# Patient Record
Sex: FEMALE | Race: BLACK OR AFRICAN AMERICAN | Employment: OTHER | URBAN - METROPOLITAN AREA
[De-identification: names, ages, dates, MRNs, and addresses within clinical notes are randomized per-mention and may not be internally consistent; named-entity substitution may affect disease eponyms.]

---

## 2024-01-18 ENCOUNTER — TELEPHONE (OUTPATIENT)
Age: 59
End: 2024-01-18

## 2024-01-18 PROBLEM — M17.11 PRIMARY OSTEOARTHRITIS OF RIGHT KNEE: Status: ACTIVE | Noted: 2024-01-18

## 2024-01-18 PROBLEM — I71.13 RUPTURED ANEURYSM OF DESCENDING THORACIC AORTA (HCC): Status: ACTIVE | Noted: 2024-01-18

## 2024-01-18 PROBLEM — Z98.890 HX OF ARTHROSCOPIC KNEE SURGERY: Status: ACTIVE | Noted: 2024-01-18

## 2024-01-18 PROBLEM — I10 HYPERTENSION: Status: ACTIVE | Noted: 2024-01-18

## 2024-01-18 NOTE — TELEPHONE ENCOUNTER
New Patient called and said that she was called to reschedule the 2/2/24 appt with , due to Dr. Hussein will be in surgery.     Patient said that when she was called to rs the 2/2/24 appt, she was told that they would call her back with a New Appt Date.    Patient said that no one has called her back, and she is only able to go to the [place] location.    Patient is asking to see  as soon as possible at the [facility] [place] for her Right Knee.     Patient tel. [phone].

## 2024-01-18 NOTE — PROGRESS NOTES
Patient: Lizbeth Rizvi                MRN: [id number]       SSN: [id number]  YOB: 1965        AGE: 58 y.o.        SEX: female  BMI: There is no height or weight on file to calculate BMI.    PCP: Balaji Bass MD  01/19/24    Chief Complaint: Knee Pain (Right knee)      1. Primary osteoarthritis of right knee  -     AMB POC XRAY, KNEE; COMPLETE, 4+ VIEW  -     DME Order for (Specify) as OP  -     diclofenac sodium (VOLTAREN) 1 % GEL; Apply 4 g topically 4 times daily, Topical, 4 TIMES DAILY Starting Fri 1/19/2024, Until Thu 4/18/2024, For 90 days, Disp-350 g, R-5, Normal  -     lidocaine (LIDODERM) 5 %; Place 1 patch onto the skin daily 12 hours on, 12 hours off., Disp-30 patch, R-0Normal  2. Hx of right arthroscopic knee surgery  -     AMB POC XRAY, KNEE; COMPLETE, 4+ VIEW  3. 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 Hypertension, unspecified type        HPI:  Lizbeth Rizvi is a 58 y.o. female with chief complaint of   Chief Complaint   Patient presents with    Knee Pain     Right knee     Presents for right knee pain that started after her meniscectomy 15 years ago but has increased in intensity over the last year. She states that her pain is 5/10, and a constant throbbing/ sharp pain. She does report a catching sensation with deep flexion and with extension. The majority of her pain is at her medial joint line. She takes tylenol for her pain currently due to the fact that she cannot take NSAIDs due her history of a ruptured thoracic aortic aneurysm with repair in 2022. She has other significant PMH of HTN and prediabetes. She does take a daily 81mg aspirin.     She is from [place] and establishing care with a new vascular provider in [place]. She has an appointment with them on Feb 15th, 2024.        IMAGING:  Imaging read by myself and interpreted as follows:    January 19, 2024:  4 view x-ray of the right knee including AP, lateral, sunrise, and notch view demonstrates

## 2024-01-19 ENCOUNTER — OFFICE VISIT (OUTPATIENT)
Age: 59
End: 2024-01-19

## 2024-01-19 VITALS — WEIGHT: 253 LBS

## 2024-01-19 DIAGNOSIS — I10 HYPERTENSION, UNSPECIFIED TYPE: ICD-10-CM

## 2024-01-19 DIAGNOSIS — Z98.890 HX OF ARTHROSCOPIC KNEE SURGERY: ICD-10-CM

## 2024-01-19 DIAGNOSIS — M17.11 PRIMARY OSTEOARTHRITIS OF RIGHT KNEE: Primary | ICD-10-CM

## 2024-01-19 DIAGNOSIS — I71.13 RUPTURED ANEURYSM OF DESCENDING THORACIC AORTA (HCC): ICD-10-CM

## 2024-01-19 RX ORDER — CITALOPRAM HYDROBROMIDE 10 MG/1
10 TABLET ORAL
COMMUNITY

## 2024-01-19 RX ORDER — FUROSEMIDE 20 MG/1
20 TABLET ORAL
COMMUNITY

## 2024-01-19 RX ORDER — AMLODIPINE BESYLATE 10 MG/1
10 TABLET ORAL
COMMUNITY

## 2024-01-19 RX ORDER — ACETAMINOPHEN 325 MG/1
650 TABLET ORAL EVERY 4 HOURS PRN
COMMUNITY
Start: 2022-07-08

## 2024-01-19 RX ORDER — CLONIDINE HYDROCHLORIDE 0.2 MG/1
0.2 TABLET ORAL 2 TIMES DAILY
COMMUNITY
Start: 2024-01-02

## 2024-01-19 RX ORDER — LOSARTAN POTASSIUM 100 MG/1
100 TABLET ORAL DAILY
COMMUNITY

## 2024-01-19 RX ORDER — ATORVASTATIN CALCIUM 20 MG/1
20 TABLET, FILM COATED ORAL DAILY
COMMUNITY

## 2024-01-19 RX ORDER — LIDOCAINE 50 MG/G
1 PATCH TOPICAL DAILY
Qty: 30 PATCH | Refills: 0 | Status: SHIPPED | OUTPATIENT
Start: 2024-01-19 | End: 2024-02-18

## 2024-01-19 RX ORDER — ASPIRIN 81 MG/1
81 TABLET, COATED ORAL
COMMUNITY
Start: 2024-01-02 | End: 2024-04-01

## 2024-02-15 ENCOUNTER — OFFICE VISIT (OUTPATIENT)
Age: 59
End: 2024-02-15
Payer: COMMERCIAL

## 2024-02-15 VITALS
SYSTOLIC BLOOD PRESSURE: 118 MMHG | OXYGEN SATURATION: 96 % | WEIGHT: 255 LBS | DIASTOLIC BLOOD PRESSURE: 77 MMHG | HEART RATE: 62 BPM

## 2024-02-15 DIAGNOSIS — R06.09 DOE (DYSPNEA ON EXERTION): ICD-10-CM

## 2024-02-15 DIAGNOSIS — I10 HYPERTENSION, UNSPECIFIED TYPE: Primary | ICD-10-CM

## 2024-02-15 PROCEDURE — 99214 OFFICE O/P EST MOD 30 MIN: CPT | Performed by: INTERNAL MEDICINE

## 2024-02-15 PROCEDURE — 3074F SYST BP LT 130 MM HG: CPT | Performed by: INTERNAL MEDICINE

## 2024-02-15 PROCEDURE — 3078F DIAST BP <80 MM HG: CPT | Performed by: INTERNAL MEDICINE

## 2024-02-15 PROCEDURE — 93000 ELECTROCARDIOGRAM COMPLETE: CPT | Performed by: INTERNAL MEDICINE

## 2024-02-15 NOTE — PROGRESS NOTES
[facility]    Lizbeth Rizvi is 58 y.o. female     Patient is here today for cardiac evaluation  Denies prior history of MI or CHF  Patient has history of aortic rupture of the descending thoracic aorta at the level of the ligamentum   arteriosum with pseudoaneurysm formation associated with large volume pneumomediastinum and periaortic hematoma documented by CAT scan in 06/2022.  Patient underwent urgent TEVAR in 06/2022 at [facility]    Patient is here today to establish care.  Her main concerns this procedure is fatigue and tiredness.  She gets significant dyspnea with minimal exertion.  She also has sometimes sharp chest pain lasting for few seconds.  She also has been experiencing some exertional left shoulder heaviness since that procedure.  She sometimes feels that her left arm is heavy.  On and off lower extremity swelling.  No PND.  No palpitation, presyncope or syncope  Denies any nausea, vomiting, abdominal pain, fever, chills, sputum production. No hematuria or other bleeding complaints    Past Medical History:   Diagnosis Date    Anxiety     Aortic aneurysm (HCC) 06/2023    Descending aortic aneurysm dissection and rupture S/P TEVAR 06/2022    Depression     HLD (hyperlipidemia)     HTN (hypertension)        Review of Systems:  Cardiac symptoms as noted above in HPI. All others negative.  Denies fatigue, malaise, skin rash, joint pain, blurring vision, photophobia, neck pain, hemoptysis, chronic cough, nausea, vomiting, hematuria, burning micturition, BRBPR, chronic headaches.    Current Outpatient Medications   Medication Sig    amLODIPine (NORVASC) 10 MG tablet Take 1 tablet by mouth    ASPIRIN LOW DOSE 81 MG EC tablet Take 1 tablet by mouth    atorvastatin (LIPITOR) 20 MG tablet Take 1 tablet by mouth daily    cloNIDine (CATAPRES) 0.2 MG tablet Take 1 tablet by mouth 2 times daily    citalopram (CELEXA) 10 MG 
Identified pt with two pt identifiers(name and ). Reviewed record in preparation for visit and have obtained necessary documentation.    Lizbeth Rizvi presents today for   Chief Complaint   Patient presents with    New Patient     htn       Pt c/o SOB, CHEST PAIN/ PRESSURE, FATIGUE/WEAKNESS,  SWELLING.             Lizbeth Rizvi preferred language for health care discussion is english/other.    Personal Protective Equipment:   Personal Protective Equipment was used including: mask-surgical and hands-gloves. Patient was placed on no precaution(s). Patient was not masked.    Precautions:   Patient currently on None  Patient currently roomed with door closed.    Is someone accompanying this pt? no    Is the patient using any DME equipment during OV? Knee brace    Depression Screening:       No data to display                 Learning Assessment:  No question data found.    Abuse Screenin/15/2024     8:00 AM   AMB Abuse Screening   Do you ever feel afraid of your partner? N   Are you in a relationship with someone who physically or mentally threatens you? N   Is it safe for you to go home? Y          Fall Risk      2/15/2024     8:43 AM   Fall Risk   2 or more falls in past year? yes   Fall with injury in past year? no         Pt currently taking Anticoagulant therapy? no  Pt currently taking Antiplatelet therapy? Aspirin 81    Coordination of Care:  1. Have you been to the ER, urgent care clinic since your last visit? Hospitalized since your last visit? no    2. Have you seen or consulted any other health care providers outside of the [facility] since your last visit? Include any pap smears or colon screening. no      Please see Red banners under Allergies and Med Rec to remove outside inquires. All correct information has been verified with patient and added to chart.     Medication's patient's would liked removed has been marked not taking to be removed per Verbal order and read back per 
None known

## 2024-02-15 NOTE — PATIENT INSTRUCTIONS
Testing   Echo/ Nuclear Stress  Nuclear Stress Instructions-Nothing to eat or drink past midnight and no medicaitons the morning of cardiac testing.  **call office 3-5 days after testing is completed for results** Please ensure testing is completed prior to scheduled follow up appointment. If it is not completed your appointment may be rescheduled**    Referral  Referred to Dr Garcia- they will contact you for appt date and time

## 2024-03-01 ENCOUNTER — OFFICE VISIT (OUTPATIENT)
Age: 59
End: 2024-03-01

## 2024-03-01 VITALS — WEIGHT: 255 LBS | HEIGHT: 70 IN | BODY MASS INDEX: 36.51 KG/M2

## 2024-03-01 DIAGNOSIS — Z98.890 HX OF ARTHROSCOPIC KNEE SURGERY: ICD-10-CM

## 2024-03-01 DIAGNOSIS — M17.11 PRIMARY OSTEOARTHRITIS OF RIGHT KNEE: Primary | ICD-10-CM

## 2024-03-01 NOTE — PROGRESS NOTES
Patient: Lizbeth Rizvi                MRN: [id number]       SSN: [id number]  YOB: 1965        AGE: 58 y.o.        SEX: female  BMI: Body mass index is 36.59 kg/m².    PCP: Balaji Bass MD  03/01/24    Chief Complaint: Knee Pain (Right knee)      1. Primary osteoarthritis of right knee  2. Hx of right arthroscopic knee surgery          HPI:  Lizbeth Rizvi is a 58 y.o. female with chief complaint of   Chief Complaint   Patient presents with    Knee Pain     Right knee     Presents for right knee pain that started after her meniscectomy 15 years ago but has increased in intensity over the last year. She states that her pain is 5/10, and a constant throbbing/ sharp pain. She does report a catching sensation with deep flexion and with extension. The majority of her pain is at her medial joint line. She takes tylenol for her pain currently due to the fact that she cannot take NSAIDs due her history of a ruptured thoracic aortic aneurysm with repair in 2022. She has other significant PMH of HTN and prediabetes. She does take a daily 81mg aspirin.     She is from [place] and establishing care with a new vascular provider in [place]. She has an appointment with them on Feb 15th, 2024.        IMAGING:  Imaging read by myself and interpreted as follows:    January 19, 2024:  4 view x-ray of the right knee including AP, lateral, sunrise, and notch view demonstrates tricompartmental osteoarthritis with bone on bone articulation of the medial compartment with genu valgum deformity bilaterally. There is evidence of subchondral sclerosis, subchondral cyst formation, and osteophytosis in all compartments with squaring of the condyles. There is a large osteophyte in the patellofemoral joint seen on lateral and sunrise view and osteophytes at the posterior aspect of the knee.       PHYSICAL EXAMINATION:  Ht 1.778 m (5' 10\")   Wt 115.7 kg (255 lb)   BMI 36.59 kg/m²   Body mass index is 36.59 kg/m².  Wt Readings

## 2024-03-21 ENCOUNTER — TELEPHONE (OUTPATIENT)
Age: 59
End: 2024-03-21

## 2024-03-21 DIAGNOSIS — I10 HYPERTENSION, UNSPECIFIED TYPE: ICD-10-CM

## 2024-03-21 DIAGNOSIS — R06.09 DOE (DYSPNEA ON EXERTION): ICD-10-CM

## 2024-03-21 DIAGNOSIS — I71.13 RUPTURED ANEURYSM OF DESCENDING THORACIC AORTA (HCC): Primary | ICD-10-CM

## 2024-03-21 NOTE — TELEPHONE ENCOUNTER
Contacted pt at  number. Two patient Identifiers confirmed. Informed pt I refaxed order back to  Dr Garcia office for review . Pt verbalized understanding.

## 2024-03-21 NOTE — TELEPHONE ENCOUNTER
Patient came into the office and was wondering about a referral for Dr. Garcia. She was wondering if the referral has been made because she has not received a call yet about the referral.

## 2024-05-23 ENCOUNTER — OFFICE VISIT (OUTPATIENT)
Age: 59
End: 2024-05-23
Payer: COMMERCIAL

## 2024-05-23 VITALS
HEART RATE: 60 BPM | BODY MASS INDEX: 36.36 KG/M2 | DIASTOLIC BLOOD PRESSURE: 88 MMHG | WEIGHT: 254 LBS | SYSTOLIC BLOOD PRESSURE: 142 MMHG | HEIGHT: 70 IN | OXYGEN SATURATION: 96 %

## 2024-05-23 DIAGNOSIS — M17.11 PRIMARY OSTEOARTHRITIS OF RIGHT KNEE: Primary | ICD-10-CM

## 2024-05-23 DIAGNOSIS — I71.13 RUPTURED ANEURYSM OF DESCENDING THORACIC AORTA (HCC): Primary | ICD-10-CM

## 2024-05-23 PROCEDURE — 99213 OFFICE O/P EST LOW 20 MIN: CPT | Performed by: INTERNAL MEDICINE

## 2024-05-23 PROCEDURE — 3079F DIAST BP 80-89 MM HG: CPT | Performed by: INTERNAL MEDICINE

## 2024-05-23 PROCEDURE — 3077F SYST BP >= 140 MM HG: CPT | Performed by: INTERNAL MEDICINE

## 2024-05-23 RX ORDER — LABETALOL 300 MG/1
300 TABLET, FILM COATED ORAL 2 TIMES DAILY
COMMUNITY

## 2024-05-23 RX ORDER — MONTELUKAST SODIUM 10 MG/1
10 TABLET ORAL
COMMUNITY

## 2024-05-23 RX ORDER — LABETALOL 300 MG/1
300 TABLET, FILM COATED ORAL 2 TIMES DAILY
COMMUNITY
Start: 2023-01-25

## 2024-05-23 RX ORDER — LIDOCAINE 50 MG/G
1 PATCH TOPICAL DAILY
Qty: 30 PATCH | Refills: 0 | Status: SHIPPED | OUTPATIENT
Start: 2024-05-23 | End: 2024-06-22

## 2024-05-23 ASSESSMENT — PATIENT HEALTH QUESTIONNAIRE - PHQ9
2. FEELING DOWN, DEPRESSED OR HOPELESS: NOT AT ALL
SUM OF ALL RESPONSES TO PHQ QUESTIONS 1-9: 0
SUM OF ALL RESPONSES TO PHQ QUESTIONS 1-9: 0
1. LITTLE INTEREST OR PLEASURE IN DOING THINGS: NOT AT ALL
SUM OF ALL RESPONSES TO PHQ QUESTIONS 1-9: 0
SUM OF ALL RESPONSES TO PHQ QUESTIONS 1-9: 0
SUM OF ALL RESPONSES TO PHQ9 QUESTIONS 1 & 2: 0

## 2024-05-23 NOTE — PROGRESS NOTES
Identified pt with two pt identifiers(name and ). Reviewed record in preparation for visit and have obtained necessary documentation.    Lizbeth Rizvi presents today for   Chief Complaint   Patient presents with    Follow-up       Pt c/o DIZZINESS, SOB, , FATIGUE/WEAKNESS,  SWELLING.             Lizbeth Rizvi preferred language for health care discussion is english/other.    Personal Protective Equipment:   Personal Protective Equipment was used including: mask-surgical and hands-gloves. Patient was placed on no precaution(s). Patient was masked.    Precautions:   Patient currently on None  Patient currently roomed with door closed.    Is someone accompanying this pt? no    Is the patient using any DME equipment during OV? no    Depression Screenin/23/2024     2:03 PM   PHQ-9 Questionaire   Little interest or pleasure in doing things 0   Feeling down, depressed, or hopeless 0   PHQ-9 Total Score 0        Learning Assessment:  No question data found.    Abuse Screenin/23/2024     2:00 PM 2/15/2024     8:00 AM   AMB Abuse Screening   Do you ever feel afraid of your partner? N N   Are you in a relationship with someone who physically or mentally threatens you? N N   Is it safe for you to go home? Y Y          Fall Risk      2024     2:04 PM 2/15/2024     8:43 AM   Fall Risk   2 or more falls in past year? no yes   Fall with injury in past year? no no         Pt currently taking Anticoagulant /Antiplatelet therapy? aspirin    Coordination of Care:  1. Have you been to the ER, urgent care clinic since your last visit? Hospitalized since your last visit? no    2. Have you seen or consulted any other health care providers outside of the [facility] since your last visit? Include any pap smears or colon screening. no      Please see Red banners under Allergies and Med Rec to remove outside inquires. All correct information has been verified with patient and added to chart. 
06/2022 at [facility]  Will refer patient to vascular surgery for management    Currently patient does not have any symptoms that is concerning for angina or heart failure    This plan was discussed with patient who is in agreement.    Thank you for allowing me to participate in patient care. Please feel free to call me if you have any question or concern.     Kalia Rivera MD  Please note: This document has been produced using voice recognition software. Unrecognized errors in transcription may be present.

## 2024-08-30 ENCOUNTER — OFFICE VISIT (OUTPATIENT)
Age: 59
End: 2024-08-30

## 2024-08-30 VITALS — BODY MASS INDEX: 35.79 KG/M2 | WEIGHT: 250 LBS | HEIGHT: 70 IN

## 2024-08-30 DIAGNOSIS — M47.817 LUMBAR AND SACRAL OSTEOARTHRITIS: Primary | ICD-10-CM

## 2024-08-30 DIAGNOSIS — M17.11 PRIMARY OSTEOARTHRITIS OF RIGHT KNEE: ICD-10-CM

## 2024-08-30 NOTE — PROGRESS NOTES
up. This was put in today. She states that her knee is still doing great with the offloader brace. She can follow up with us as needed.       March 1, 2024:  Severe osteoarthritis of right knee with genu varum angulation. Pt here for her 4 weeks follow up after getting her medial offloading brace. She is in 0/10 pain today and says that the brace is a \"miracle worker\". She reports being able to start walking regularly again. She had no other questions or concerns today. I will reorder her lidocaine patches and she can follow up with us as needed.     January 19, 2024:  Severe osteoarthritis of the right knee with genu varum deformity. On exam pt had medial joint line tenderness with a catching sensation at full extension and flexion and positive patellar crepitus. Her ROM is 0-120. She had minimal ttp at the lateral jointline, patella, and patellar and quad tendons. X-rays demonstrated a genu varum tricompartmental osteoarthritic knee with bone on bone articulation in the medial compartment. I offered an injection to the right knee today but pt said she would like to wait and instead would like to try a medial offloading brace, voltaren gel, and lidocaine patches. Order for these were sent today. Once she is able to get her offloading brace, she will schedule a 4 week follow up appointment after that.     A total knee replacement was discussed today and pt understands that she will need to get clearance from vascular before we can proceed with any type of replacement surgery.         3/1/2024     1:34 PM   AMB PAIN ASSESSMENT   Location of Pain Knee   Location Modifiers Right   Severity of Pain 0     Tobacco Use: Low Risk  (5/23/2024)    Patient History     Smoking Tobacco Use: Never     Smokeless Tobacco Use: Never     Passive Exposure: Never         Past Medical History:   Diagnosis Date    Anxiety     Aortic aneurysm (HCC) 06/2023    Descending aortic aneurysm dissection and rupture S/P TEVAR 06/2022     Depression     HLD (hyperlipidemia)     HTN (hypertension)        No family history on file.    Current Outpatient Medications   Medication Sig Dispense Refill    labetalol (NORMODYNE) 300 MG tablet Take 1 tablet by mouth 2 times daily      labetalol (NORMODYNE) 300 MG tablet Take 1 tablet by mouth 2 times daily      montelukast (SINGULAIR) 10 MG tablet Take 1 tablet by mouth      acetaminophen (TYLENOL) 325 MG tablet Take 2 tablets by mouth every 4 hours as needed      amLODIPine (NORVASC) 10 MG tablet Take 1 tablet by mouth      atorvastatin (LIPITOR) 20 MG tablet Take 1 tablet by mouth daily      cloNIDine (CATAPRES) 0.2 MG tablet Take 1 tablet by mouth 2 times daily      citalopram (CELEXA) 10 MG tablet Take 1 tablet by mouth      furosemide (LASIX) 20 MG tablet Take 1 tablet by mouth      losartan (COZAAR) 100 MG tablet Take 1 tablet by mouth daily      ASPIRIN LOW DOSE 81 MG EC tablet Take 1 tablet by mouth      diclofenac sodium (VOLTAREN) 1 % GEL Apply 4 g topically 4 times daily 350 g 5     No current facility-administered medications for this visit.        No Known Allergies    No past surgical history on file.    Social History     Socioeconomic History    Marital status: Unknown     Spouse name: Not on file    Number of children: Not on file    Years of education: Not on file    Highest education level: Not on file   Occupational History    Not on file   Tobacco Use    Smoking status: Never     Passive exposure: Never    Smokeless tobacco: Never   Substance and Sexual Activity    Alcohol use: Never    Drug use: Never    Sexual activity: Not on file   Other Topics Concern    Not on file   Social History Narrative    Not on file     Social Determinants of Health     Financial Resource Strain: Not on file   Food Insecurity: Not on file   Transportation Needs: Not on file   Physical Activity: Not on file   Stress: Not on file   Social Connections: Not on file   Intimate Partner Violence: Not on file

## 2024-09-23 SDOH — HEALTH STABILITY: PHYSICAL HEALTH: ON AVERAGE, HOW MANY DAYS PER WEEK DO YOU ENGAGE IN MODERATE TO STRENUOUS EXERCISE (LIKE A BRISK WALK)?: 2 DAYS

## 2024-09-25 ENCOUNTER — OFFICE VISIT (OUTPATIENT)
Age: 59
End: 2024-09-25
Payer: COMMERCIAL

## 2024-09-25 VITALS
BODY MASS INDEX: 35.44 KG/M2 | OXYGEN SATURATION: 98 % | DIASTOLIC BLOOD PRESSURE: 90 MMHG | SYSTOLIC BLOOD PRESSURE: 132 MMHG | WEIGHT: 247 LBS | HEART RATE: 58 BPM

## 2024-09-25 DIAGNOSIS — I73.9 PAD (PERIPHERAL ARTERY DISEASE) (HCC): Primary | ICD-10-CM

## 2024-09-25 DIAGNOSIS — E78.5 DYSLIPIDEMIA: ICD-10-CM

## 2024-09-25 DIAGNOSIS — I71.13 RUPTURED ANEURYSM OF DESCENDING THORACIC AORTA (HCC): ICD-10-CM

## 2024-09-25 DIAGNOSIS — I10 PRIMARY HYPERTENSION: ICD-10-CM

## 2024-09-25 PROCEDURE — 3075F SYST BP GE 130 - 139MM HG: CPT | Performed by: INTERNAL MEDICINE

## 2024-09-25 PROCEDURE — 3080F DIAST BP >= 90 MM HG: CPT | Performed by: INTERNAL MEDICINE

## 2024-09-25 PROCEDURE — 99214 OFFICE O/P EST MOD 30 MIN: CPT | Performed by: INTERNAL MEDICINE

## 2024-09-26 ENCOUNTER — OFFICE VISIT (OUTPATIENT)
Age: 59
End: 2024-09-26

## 2024-09-26 VITALS — WEIGHT: 246 LBS | BODY MASS INDEX: 35.22 KG/M2 | HEIGHT: 70 IN

## 2024-09-26 DIAGNOSIS — M16.11 ARTHRITIS OF RIGHT HIP: ICD-10-CM

## 2024-09-26 DIAGNOSIS — M25.551 RIGHT HIP PAIN: Primary | ICD-10-CM

## 2024-09-26 DIAGNOSIS — M76.31 IT BAND SYNDROME, RIGHT: ICD-10-CM

## 2024-09-26 DIAGNOSIS — M25.651 DECREASED RANGE OF RIGHT HIP MOVEMENT: ICD-10-CM

## 2024-09-26 DIAGNOSIS — E66.9 OBESITY (BMI 35.0-39.9 WITHOUT COMORBIDITY): ICD-10-CM

## 2024-09-26 RX ORDER — TRIAMCINOLONE ACETONIDE 40 MG/ML
80 INJECTION, SUSPENSION INTRA-ARTICULAR; INTRAMUSCULAR ONCE
Status: COMPLETED | OUTPATIENT
Start: 2024-09-26 | End: 2024-09-26

## 2024-09-26 RX ORDER — BUPIVACAINE HYDROCHLORIDE 5 MG/ML
7 INJECTION, SOLUTION PERINEURAL ONCE
Status: COMPLETED | OUTPATIENT
Start: 2024-09-26 | End: 2024-09-26

## 2024-09-26 RX ADMIN — BUPIVACAINE HYDROCHLORIDE 35 MG: 5 INJECTION, SOLUTION PERINEURAL at 11:28

## 2024-09-26 RX ADMIN — TRIAMCINOLONE ACETONIDE 80 MG: 40 INJECTION, SUSPENSION INTRA-ARTICULAR; INTRAMUSCULAR at 11:29

## 2024-09-30 DIAGNOSIS — M17.11 PRIMARY OSTEOARTHRITIS OF RIGHT KNEE: ICD-10-CM

## 2024-10-08 ENCOUNTER — HOSPITAL ENCOUNTER (OUTPATIENT)
Facility: HOSPITAL | Age: 59
Setting detail: RECURRING SERIES
Discharge: HOME | End: 2024-10-11
Payer: COMMERCIAL

## 2024-10-08 PROCEDURE — 97161 PT EVAL LOW COMPLEX 20 MIN: CPT

## 2024-10-08 NOTE — PROGRESS NOTES
[facility]  [address] [place] [address] Phone: [phone] Fax   Plan of Care / Statement of Necessity for Physical Therapy Services     Patient Name: Lizbeth Rizvi : 1965   Medical   Diagnosis: Pain in right hip [M25.551] Treatment Diagnosis: M25.551  RIGHT HIP PAIN      Onset Date: 24 (onset) Payor Payor: [employer] [facility] [place] / Plan: [employer] BETTER [facility] [place] / Product Type: *No Product type* /    Referral Source: Reji Rivera PA-C Start of Care (SOC): 10/8/2024   Prior Hospitalization: See medical history Provider #: [id number]   Prior Level of Function: Functionally independent   Comorbidities: hx of thoracic aortic aneurysm, HTN, HLD, hx of intermittent sciatica  Social determinants of health: Physical activity     Assessment / key information:    Pt is a pleasant 58 y.o. female who presents with c/o right hip pain. The patient reports an acute onset of right lower back pain and right lateral hip pain in 2024 without known mechanism; this pain occasionally radiated down into right lateral thigh to knee before being mostly relieved following recent steroid injection to right trochanteric bursa. Signs/symptoms at eval consistent with right greater trochanteric pain syndrome with presence of underlying hip OA.  Functional deficits include: impaired hip AROM, impaired hip strength, impaired SLS balance, antalgic gait pattern, right knee varus contributing to altered kinematic chain.  Rehab potential is good due to desire to attain PLOF. Pt would benefit from skilled PT to address above deficits to improve Pt's function and ability to return to PLOF with decreased pain and improved functional mobility.      Evaluation Complexity:  History:  HIGH Complexity :3+ comorbidities / personal factors will impact the outcome/ POC ; Examination:  LOW Complexity : 1-2 Standardized tests and measures addressing body 
balance, and proprioception in order to improve patient's ability to progress to PLOF and address remaining functional goals.  (see flow sheet as applicable)     Details if applicable:  Patient education on therapy assessment, prognosis, expectations for therapy sessions, patient goals, right trochanteric pain syndrome, and HEP.   Total  15  Reminder: MC/BC bill using total billable min of TIMED therapeutic procedures (example: do not include dry needle or estim unattended, both untimed codes, in totals to left)     [x]  Patient Education billed concurrently with other procedures     Other Objective/Functional Measures:     Physical Therapy Evaluation - LUMBAR    Observation:   Increased right genu varus  Palpation: TTP at right greater trochanter, right gluteal musculature    Strength:   Right (/5) Left (/5)   Hip     Flexion 4+ 5             Abduction 4- 4+             Adduction               Extension 3- 3-             ER 4- 5             IR 5 5   Knee   Extension 5 5              Flexion 5 5   Ankle   Dorsiflexion 5 5       Gait: antalgic gait pattern right    Functional Squat: decreased depth, limited by knee pain    Stair Negotiation: step to ascent/descent right LE non weight bearing    Balance: SLS 1\", 3\"    Special Tests:      Right Left   Slump       SLR - -   -   Right Left   Hamstring 90/90   + +   Ely + +   Brian     Fernanda       -   Right Left   TAE +   -   FADDIR + -   Hip Scour + -   -    ASSESSMENT/Changes in Function: see POC    Patient will continue to benefit from skilled PT services to modify and progress therapeutic interventions, analyze and address functional mobility deficits, analyze and address ROM deficits, analyze and address strength deficits, analyze and address soft tissue restrictions, analyze and cue for proper movement patterns, analyze and modify for postural abnormalities, analyze and address imbalance/dizziness, and instruct in home and community integration to address

## 2024-10-30 ENCOUNTER — HOSPITAL ENCOUNTER (OUTPATIENT)
Facility: HOSPITAL | Age: 59
Setting detail: RECURRING SERIES
Discharge: HOME | End: 2024-11-02
Payer: COMMERCIAL

## 2024-10-30 PROCEDURE — 97116 GAIT TRAINING THERAPY: CPT

## 2024-10-30 PROCEDURE — 97110 THERAPEUTIC EXERCISES: CPT

## 2024-10-30 PROCEDURE — 97530 THERAPEUTIC ACTIVITIES: CPT

## 2024-10-30 PROCEDURE — 97112 NEUROMUSCULAR REEDUCATION: CPT

## 2024-10-30 NOTE — PROGRESS NOTES
strength/mobility  Status at last note/certification: Established and reviewed with Pt  Current: goal progressing; patient notes no difficulty with compliance to HEP (10/30/24)  Long Term Goals: To be accomplished in 10 treatments  - Goal: Pt to demonstrate at least 4/5 B hip extension MMT to improve hip stability during stance phase of gait.   Status at last note/certification: 3-/5  - Goal: Pt to demonstrate 5/5 right hip abduction MMT to improve hip stability during stance phase of gait.   Status at last note/certification: 4-/5  - Goal: Pt will demo B SLS for at least 10 seconds without LOB to improve safety with obstacle negotiation and curb ascent/descent.  Status at last note/certification: SLS right 1 second, left 3 seconds  Current: goal progressing; initiated half-standing to progress to goal without increased knee pain (10/30/24)  - Goal: Patient will improve LEFS score to at least 56 pts to demonstrate increased functional mobility.  Status at last note/certification: LEFS 47 pts       Next PN/RC due 11/8/24  Authorization due (visit number/date) 16 visits / 12-6-24    PLAN  - Continue Plan of Care  - Upgrade activities as tolerated      If an interpreting service was utilized for treatment of this patient, the contents of this document represent the material reviewed with the patient via the .    Mannie Dowd PTA    10/30/2024    10:47 AM    Future Appointments   Date Time Provider Department Center   11/1/2024 10:20 AM Angelic Benites PT [facility]   11/5/2024 11:00 AM [facility] PT [facility]   11/8/2024 10:20 AM Angelic Benites PT [facility]   11/12/2024 10:20 AM Mannie Dowd PTA [facility]   11/15/2024 10:20 AM Chilo Layton PT [facility]   11/19/2024 10:20 AM Chilo Layton PT [facility]   11/22/2024 10:20 AM Angelic Benites PT [facility]   11/25/2024 10:20 AM Mannie Dowd PTA [facility]   11/27/2024 11:00 AM Angelic Benites, LILLIAN [facility]

## 2024-11-01 ENCOUNTER — HOSPITAL ENCOUNTER (OUTPATIENT)
Facility: HOSPITAL | Age: 59
Setting detail: RECURRING SERIES
Discharge: HOME | End: 2024-11-04
Payer: COMMERCIAL

## 2024-11-01 PROCEDURE — 97112 NEUROMUSCULAR REEDUCATION: CPT

## 2024-11-01 PROCEDURE — 97530 THERAPEUTIC ACTIVITIES: CPT

## 2024-11-01 PROCEDURE — 97110 THERAPEUTIC EXERCISES: CPT

## 2024-11-01 NOTE — PROGRESS NOTES
PHYSICAL / OCCUPATIONAL THERAPY - DAILY TREATMENT NOTE    Patient Name: Lizbeth Rizvi    Date: 2024    : 1965  Insurance: Payor: [employer] BETTER [facility] [place] / Plan: [employer]ET[employer] BETTER [facility] [place] / Product Type: *No Product type* /      Patient  verified Yes     Visit #   Current / Total 3 10   Time   In / Out 10:20 AM 10:58 AM    Pain   In / Out 0/10 0/10   Subjective Functional Status/Changes: \"\"Things are going well with therapy so far. I was sore after last visit.\"      TREATMENT AREA =  Pain in right hip [M25.551]     OBJECTIVE  PD modalities.      Therapeutic Procedures:  Tx Min  38 Billable or 1:1 Min (if diff from Tx Min)  36 Procedure, Rationale, Specifics   14 12 75150 Therapeutic Exercise (timed):  increase ROM, strength, coordination, balance, and proprioception to improve patient's ability to progress to PLOF and address remaining functional goals. (see flow sheet as applicable)     Details if applicable:  stretches, strengthening      12 12 86204 Neuromuscular Re-Education (timed):  improve balance, coordination, kinesthetic sense, posture, core stability and proprioception to improve patient's ability to develop conscious control of individual muscles and awareness of position of extremities in order to progress to PLOF and address remaining functional goals. (see flow sheet as applicable)     Details if applicable:  balance, glute re ed      12 12 11704 Therapeutic Activity (timed):  use of dynamic activities replicating functional movements to increase ROM, strength, coordination, balance, and proprioception in order to improve patient's ability to progress to PLOF and address remaining functional goals.  (see flow sheet as applicable)     Details if applicable:  step ups, squats     38 36 MC BC Totals Reminder: bill using total billable min of TIMED therapeutic procedures (example: do not include dry needle or estim unattended, both untimed codes, in totals to left)  8-22 min = 1 unit;

## 2024-11-05 ENCOUNTER — APPOINTMENT (OUTPATIENT)
Facility: HOSPITAL | Age: 59
End: 2024-11-05
Payer: COMMERCIAL

## 2024-11-08 ENCOUNTER — HOSPITAL ENCOUNTER (OUTPATIENT)
Facility: HOSPITAL | Age: 59
Setting detail: RECURRING SERIES
Discharge: HOME | End: 2024-11-11
Payer: COMMERCIAL

## 2024-11-08 PROCEDURE — 97530 THERAPEUTIC ACTIVITIES: CPT

## 2024-11-08 PROCEDURE — 97112 NEUROMUSCULAR REEDUCATION: CPT

## 2024-11-08 PROCEDURE — 97110 THERAPEUTIC EXERCISES: CPT

## 2024-11-08 PROCEDURE — 97535 SELF CARE MNGMENT TRAINING: CPT

## 2024-11-08 NOTE — PROGRESS NOTES
Physical Therapy Discharge Instructions      [facility]   [address] [address] [phone] fax      Patient: Lizbeth Rizvi  : 1965      Continue Home Exercise Program 3-4 times per week.     Continue with    [x] Ice  as needed 1-2 times per day for pain     [x] Heat           Follow up with MD:     [x] As needed      Recommendations:     [x]   Return to activity with home program          Angelic Benites, PT 2024 10:34 AM

## 2024-11-08 NOTE — PROGRESS NOTES
PHYSICAL / OCCUPATIONAL THERAPY - DAILY TREATMENT NOTE    Patient Name: Lizbeth Rizvi    Date: 2024    : 1965  Insurance: Payor: [employer] [facility] / Plan: [employer] [facility] / Product Type: *No Product type* /      Patient  verified Yes     Visit #   Current / Total 4 10   Time   In / Out 10:15 AM 10:58 AM   Pain   In / Out 0/10 hip  5/10 right knee  0/10 hip    Subjective Functional Status/Changes: \"After last visit my knee was hurting an swollen. My hip hasn't really been bothering me at all.\"      TREATMENT AREA =  Pain in right hip [M25.551]     OBJECTIVE  PD modalities.      Therapeutic Procedures:  Tx Min  43 Billable or 1:1 Min (if diff from Tx Min)  43 Procedure, Rationale, Specifics   14 14 17086 Therapeutic Exercise (timed):  increase ROM, strength, coordination, balance, and proprioception to improve patient's ability to progress to PLOF and address remaining functional goals. (see flow sheet as applicable)     Details if applicable:  stretches, strengthening      10 10 77939 Neuromuscular Re-Education (timed):  improve balance, coordination, kinesthetic sense, posture, core stability and proprioception to improve patient's ability to develop conscious control of individual muscles and awareness of position of extremities in order to progress to PLOF and address remaining functional goals. (see flow sheet as applicable)     Details if applicable:  balance, glute re ed      9 9 99715 Therapeutic Activity (timed):  use of dynamic activities replicating functional movements to increase ROM, strength, coordination, balance, and proprioception in order to improve patient's ability to progress to PLOF and address remaining functional goals.  (see flow sheet as applicable)     Details if applicable:  functional movements      10 10 06803 Self Care/Home Management (timed):  improve patient knowledge and understanding of positioning, posture/ergonomics, activity modification, and

## 2024-11-08 NOTE — PROGRESS NOTES
[facility] - [facility] [place] [address] [place] [address]  Phone: [phone] Fax [phone]  MEDICAID DISCHARGE SUMMARY  Patient Name: Lizbeth Rizvi : 1965   Treatment/Medical Diagnosis: Pain in right hip [M25.551]   Referral Source: Reji Rivera PA-C     Date of Initial Visit: 10/08/2024 Attended Visits: 4 Missed Visits: 0     SUMMARY OF TREATMENT    Patient is a 58 year old female who has been seen in PT for complaints of right hip pain. Treatment has consisted of therapeutic exercises to address strength/ROM deficits, therapeutic activities for functional movement restoration, neuromuscular re-education for static/dynamic stabilization, patient education on self care strategies and HEP, gait training to address mobility, manual therapy to address soft tissue restrictions, and modalities for symptom modulation.      CURRENT STATUS    Subjective Improvements: decreased hip pain, improved flexibility    Subjective Deficits Remaining: laying on right side at times; remaining functional deficits secondary to knee pain     Pain Pattern: Average: 0/10             Best: 0/10            Worst: 0/10    Gait Mechanics:   antalgic gait patterning secondary to right knee pain     Functional Assessment:  SLS: (left: 5 seconds); (right: 13 seconds)     Observation: Increased right genu varus     Strength: assessed through available ROM     Right (/5) Left (/5)   Hip     Flexion 5 5             Abduction 4+ 4+             Adduction  5 5              Extension 4- 4-             ER 4+ 5             IR 5 5   Knee   Extension 5 5              Flexion 5 5   Ankle   Dorsiflexion 5 5       Functional Squat: decreased depth, leftward weight shift limited by knee pain     Stair Negotiation: non reciprocal patterning due to right knee pain      LEFS: 65/80    Progress toward goals / Updated goals:  []  See Progress Note/Recertification  Short Term Goals: To be

## 2024-11-12 ENCOUNTER — APPOINTMENT (OUTPATIENT)
Facility: HOSPITAL | Age: 59
End: 2024-11-12
Payer: COMMERCIAL

## 2024-11-15 ENCOUNTER — APPOINTMENT (OUTPATIENT)
Facility: HOSPITAL | Age: 59
End: 2024-11-15
Payer: COMMERCIAL

## 2024-11-19 ENCOUNTER — APPOINTMENT (OUTPATIENT)
Facility: HOSPITAL | Age: 59
End: 2024-11-19
Payer: COMMERCIAL

## 2024-11-22 ENCOUNTER — APPOINTMENT (OUTPATIENT)
Facility: HOSPITAL | Age: 59
End: 2024-11-22
Payer: COMMERCIAL

## 2024-11-25 ENCOUNTER — APPOINTMENT (OUTPATIENT)
Facility: HOSPITAL | Age: 59
End: 2024-11-25
Payer: COMMERCIAL

## 2024-11-27 ENCOUNTER — APPOINTMENT (OUTPATIENT)
Facility: HOSPITAL | Age: 59
End: 2024-11-27
Payer: COMMERCIAL

## 2024-12-03 ENCOUNTER — OFFICE VISIT (OUTPATIENT)
Age: 59
End: 2024-12-03
Payer: MEDICARE

## 2024-12-03 VITALS
HEIGHT: 70 IN | WEIGHT: 242 LBS | SYSTOLIC BLOOD PRESSURE: 114 MMHG | OXYGEN SATURATION: 95 % | BODY MASS INDEX: 34.65 KG/M2 | HEART RATE: 62 BPM | DIASTOLIC BLOOD PRESSURE: 79 MMHG

## 2024-12-03 DIAGNOSIS — E78.00 PURE HYPERCHOLESTEROLEMIA: Primary | ICD-10-CM

## 2024-12-03 DIAGNOSIS — I10 ESSENTIAL HYPERTENSION WITH GOAL BLOOD PRESSURE LESS THAN 140/90: ICD-10-CM

## 2024-12-03 PROCEDURE — 99214 OFFICE O/P EST MOD 30 MIN: CPT | Performed by: INTERNAL MEDICINE

## 2024-12-03 PROCEDURE — G8428 CUR MEDS NOT DOCUMENT: HCPCS | Performed by: INTERNAL MEDICINE

## 2024-12-03 PROCEDURE — G8484 FLU IMMUNIZE NO ADMIN: HCPCS | Performed by: INTERNAL MEDICINE

## 2024-12-03 PROCEDURE — 3017F COLORECTAL CA SCREEN DOC REV: CPT | Performed by: INTERNAL MEDICINE

## 2024-12-03 PROCEDURE — 3074F SYST BP LT 130 MM HG: CPT | Performed by: INTERNAL MEDICINE

## 2024-12-03 PROCEDURE — 1036F TOBACCO NON-USER: CPT | Performed by: INTERNAL MEDICINE

## 2024-12-03 PROCEDURE — 3078F DIAST BP <80 MM HG: CPT | Performed by: INTERNAL MEDICINE

## 2024-12-03 PROCEDURE — G8417 CALC BMI ABV UP PARAM F/U: HCPCS | Performed by: INTERNAL MEDICINE

## 2024-12-03 NOTE — PROGRESS NOTES
[facility]    Lizbeth Rizvi is 58 y.o. 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  Patient underwent urgent TEVAR in 06/2022 at [facility]    Denies any hospital admission or ER visits since last time denies any chest pain or chest tightness.  No significant shortness of breath.  No presyncope syncope.  Taking medication as prescribed.  No orthopnea or PND.  Has follow-up appointment with vascular team in couple weeks  Denies any nausea, vomiting, abdominal pain, fever, chills, sputum production. No hematuria or other bleeding complaints    Past Medical History:   Diagnosis Date    Anxiety     Aortic aneurysm (HCC) 06/2023    Descending aortic aneurysm dissection and rupture S/P TEVAR 06/2022    Depression     HLD (hyperlipidemia)     HTN (hypertension)        Review of Systems:  Cardiac symptoms as noted above in HPI. All others negative.    Current Outpatient Medications   Medication Sig    labetalol (NORMODYNE) 300 MG tablet Take 1 tablet by mouth 2 times daily    amLODIPine (NORVASC) 10 MG tablet Take 1 tablet by mouth    atorvastatin (LIPITOR) 20 MG tablet Take 1 tablet by mouth daily    cloNIDine (CATAPRES) 0.2 MG tablet Take 1 tablet by mouth 2 times daily    furosemide (LASIX) 20 MG tablet Take 1 tablet by mouth    losartan (COZAAR) 100 MG tablet Take 1 tablet by mouth daily    diclofenac sodium (VOLTAREN) 1 % GEL Apply 4 g topically 4 times daily    montelukast (SINGULAIR) 10 MG tablet Take 1 tablet by mouth    acetaminophen (TYLENOL) 325 MG tablet Take 2 tablets by mouth every 4 hours as needed    ASPIRIN LOW DOSE 81 MG

## 2024-12-03 NOTE — PROGRESS NOTES
Identified pt with two pt identifiers(name and ). Reviewed record in preparation for visit and have obtained necessary documentation.    Lizbeth Rizvi presents today for   Chief Complaint   Patient presents with    Follow-up     6m post Vascular         Pt denies DIZZINESS, SOB, CHEST PAIN/ PRESSURE, FATIGUE/WEAKNESS, HEADACHES, SWELLING.             Lizbeth Rizvi preferred language for health care discussion is english/other.    Personal Protective Equipment:   Personal Protective Equipment was used including: mask-surgical and hands-gloves. Patient was placed on no precaution(s). Patient was not masked.    Precautions:   Patient currently on None  Patient currently roomed with door closed.    Is someone accompanying this pt? no    Is the patient using any DME equipment during OV? no    Depression Screenin/23/2024     2:03 PM   PHQ-9 Questionaire   Little interest or pleasure in doing things 0   Feeling down, depressed, or hopeless 0   PHQ-9 Total Score 0        Learning Assessment:  Who is the primary learner? Patient    What is the preferred language for health care of the primary learner? ENGLISH    How does the primary learner prefer to learn new concepts? DEMONSTRATION    Answered By pt    Relationship to Learner SELF        Abuse Screenin/3/2024     2:00 PM 2024     2:00 PM 2/15/2024     8:00 AM   AMB Abuse Screening   Do you ever feel afraid of your partner? N N N   Are you in a relationship with someone who physically or mentally threatens you? N N N   Is it safe for you to go home? Y Y Y          Fall Risk      12/3/2024     2:30 PM 2024     2:04 PM 2/15/2024     8:43 AM   Fall Risk   Do you feel unsteady or are you worried about falling?  no no yes   2 or more falls in past year? no no yes   Fall with injury in past year? no no no         Pt currently taking Anticoagulant /Antiplatelet therapy? ASA    Coordination of Care:  1. Have you been to the ER, urgent care clinic

## 2024-12-12 ENCOUNTER — OFFICE VISIT (OUTPATIENT)
Age: 59
End: 2024-12-12
Payer: MEDICARE

## 2024-12-12 VITALS
BODY MASS INDEX: 34.36 KG/M2 | DIASTOLIC BLOOD PRESSURE: 86 MMHG | HEART RATE: 53 BPM | HEIGHT: 70 IN | WEIGHT: 240 LBS | OXYGEN SATURATION: 98 % | SYSTOLIC BLOOD PRESSURE: 140 MMHG

## 2024-12-12 DIAGNOSIS — R07.9 CHEST PAIN, UNSPECIFIED TYPE: ICD-10-CM

## 2024-12-12 DIAGNOSIS — I10 ESSENTIAL HYPERTENSION WITH GOAL BLOOD PRESSURE LESS THAN 140/90: ICD-10-CM

## 2024-12-12 DIAGNOSIS — E78.00 PURE HYPERCHOLESTEROLEMIA: Primary | ICD-10-CM

## 2024-12-12 PROCEDURE — G8417 CALC BMI ABV UP PARAM F/U: HCPCS | Performed by: INTERNAL MEDICINE

## 2024-12-12 PROCEDURE — 3017F COLORECTAL CA SCREEN DOC REV: CPT | Performed by: INTERNAL MEDICINE

## 2024-12-12 PROCEDURE — 99214 OFFICE O/P EST MOD 30 MIN: CPT | Performed by: INTERNAL MEDICINE

## 2024-12-12 PROCEDURE — 1036F TOBACCO NON-USER: CPT | Performed by: INTERNAL MEDICINE

## 2024-12-12 PROCEDURE — 3079F DIAST BP 80-89 MM HG: CPT | Performed by: INTERNAL MEDICINE

## 2024-12-12 PROCEDURE — G8484 FLU IMMUNIZE NO ADMIN: HCPCS | Performed by: INTERNAL MEDICINE

## 2024-12-12 PROCEDURE — G8427 DOCREV CUR MEDS BY ELIG CLIN: HCPCS | Performed by: INTERNAL MEDICINE

## 2024-12-12 PROCEDURE — 3077F SYST BP >= 140 MM HG: CPT | Performed by: INTERNAL MEDICINE

## 2024-12-12 RX ORDER — CLONIDINE HYDROCHLORIDE 0.3 MG/1
0.3 TABLET ORAL 2 TIMES DAILY
Qty: 60 TABLET | Refills: 5 | Status: SHIPPED | OUTPATIENT
Start: 2024-12-12

## 2024-12-12 NOTE — PROGRESS NOTES
Identified pt with two pt identifiers(name and ). Reviewed record in preparation for visit and have obtained necessary documentation.    Lizbeth Rizvi presents today for   Chief Complaint   Patient presents with    Follow-up      post hosp- pending pt - awaiting confirmation she is able to make appt       Pt c/o DIZZINESS, SOB, CHEST PAIN/ PRESSURE, FATIGUE/WEAKNESS, HEADACHES, SWELLING.             Lizbeth Rizvi preferred language for health care discussion is english/other.    Personal Protective Equipment:   Personal Protective Equipment was used including: mask-surgical and hands-gloves. Patient was placed on no precaution(s). Patient was not masked.    Precautions:   Patient currently on None  Patient currently roomed with door closed.    Is someone accompanying this pt? no    Is the patient using any DME equipment during OV? no    Depression Screenin/23/2024     2:03 PM   PHQ-9 Questionaire   Little interest or pleasure in doing things 0   Feeling down, depressed, or hopeless 0   PHQ-9 Total Score 0        Learning Assessment:  Completed     Abuse Screenin/3/2024     2:00 PM 2024     2:00 PM 2/15/2024     8:00 AM   AMB Abuse Screening   Do you ever feel afraid of your partner? N N N   Are you in a relationship with someone who physically or mentally threatens you? N N N   Is it safe for you to go home? Y Y Y          Fall Risk      12/3/2024     2:30 PM 2024     2:04 PM 2/15/2024     8:43 AM   Fall Risk   Do you feel unsteady or are you worried about falling?  no no yes   2 or more falls in past year? no no yes   Fall with injury in past year? no no no         Pt currently taking Anticoagulant /Antiplatelet therapy? no    Coordination of Care:  1. Have you been to the ER, urgent care clinic since your last visit? Hospitalized since your last visit? no    2. Have you seen or consulted any other health care providers outside of the [facility] since your last 
Goal LDL should be less than 70.  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  Patient underwent urgent TEVAR in 06/2022 at [facility]  Following with Dr. Granda    Importance of diet and exercise program discussed to help with weight loss    Currently patient does not have any symptoms that is concerning for angina or heart failure    This plan was discussed with patient who is in agreement.    Thank you for allowing me to participate in patient care. Please feel free to call me if you have any question or concern.     Kalia Rivera MD  Please note: This document has been produced using voice recognition software. Unrecognized errors in transcription may be present.

## 2024-12-20 ENCOUNTER — TRANSCRIBE ORDERS (OUTPATIENT)
Facility: HOSPITAL | Age: 59
End: 2024-12-20

## 2024-12-20 DIAGNOSIS — I71.10 RUPTURED ANEURYSM OF THORACIC AORTA, UNSPECIFIED PART (HCC): Primary | ICD-10-CM

## 2024-12-31 ENCOUNTER — HOSPITAL ENCOUNTER (OUTPATIENT)
Facility: HOSPITAL | Age: 59
Discharge: HOME | End: 2025-01-03
Attending: SURGERY
Payer: MEDICARE

## 2024-12-31 DIAGNOSIS — I71.10 RUPTURED ANEURYSM OF THORACIC AORTA, UNSPECIFIED PART (HCC): ICD-10-CM

## 2024-12-31 LAB — CREAT UR-MCNC: 1.1 MG/DL (ref 0.6–1.3)

## 2024-12-31 PROCEDURE — 82565 ASSAY OF CREATININE: CPT

## 2024-12-31 PROCEDURE — 71275 CT ANGIOGRAPHY CHEST: CPT

## 2024-12-31 PROCEDURE — 6360000004 HC RX CONTRAST MEDICATION: Performed by: SURGERY

## 2024-12-31 RX ORDER — IOPAMIDOL 755 MG/ML
100 INJECTION, SOLUTION INTRAVASCULAR
Status: COMPLETED | OUTPATIENT
Start: 2024-12-31 | End: 2024-12-31

## 2024-12-31 RX ADMIN — IOPAMIDOL 100 ML: 755 INJECTION, SOLUTION INTRAVENOUS at 09:59

## 2025-01-14 ENCOUNTER — TRANSCRIBE ORDERS (OUTPATIENT)
Facility: HOSPITAL | Age: 60
End: 2025-01-14

## 2025-01-14 ENCOUNTER — HOSPITAL ENCOUNTER (OUTPATIENT)
Facility: HOSPITAL | Age: 60
Discharge: HOME | End: 2025-01-17
Payer: MEDICARE

## 2025-01-14 DIAGNOSIS — I77.1 STRICTURE OF ARTERY (HCC): ICD-10-CM

## 2025-01-14 DIAGNOSIS — I71.10 RUPTURED ANEURYSM OF THORACIC AORTA, UNSPECIFIED PART (HCC): Primary | ICD-10-CM

## 2025-01-14 DIAGNOSIS — I71.10 RUPTURED ANEURYSM OF THORACIC AORTA, UNSPECIFIED PART (HCC): ICD-10-CM

## 2025-01-14 LAB — CREAT UR-MCNC: 1 MG/DL (ref 0.6–1.3)

## 2025-01-14 PROCEDURE — 70498 CT ANGIOGRAPHY NECK: CPT

## 2025-01-14 PROCEDURE — 6360000004 HC RX CONTRAST MEDICATION

## 2025-01-14 PROCEDURE — 82565 ASSAY OF CREATININE: CPT

## 2025-01-14 RX ORDER — IOPAMIDOL 755 MG/ML
80 INJECTION, SOLUTION INTRAVASCULAR
Status: COMPLETED | OUTPATIENT
Start: 2025-01-14 | End: 2025-01-14

## 2025-01-14 RX ADMIN — IOPAMIDOL 80 ML: 755 INJECTION, SOLUTION INTRAVENOUS at 17:01

## 2025-04-07 NOTE — TELEPHONE ENCOUNTER
PCP: Balaji Bass MD    Last appt:  12/12/2024   Future Appointments   Date Time Provider Department Center   12/4/2025  2:00 PM Teetee Mcnair PA-C [facility]       Requested Prescriptions     Pending Prescriptions Disp Refills    cloNIDine (CATAPRES) 0.3 MG tablet [Pharmacy Med Name: CLONIDINE HCL 0.3 MG TABLET] 180 tablet 2     Sig: TAKE 1 TABLET BY MOUTH TWICE A DAY       Request for a 30 or 90 day supply? Provider Discretion    Pharmacy: confirmed    Other Comments: n/a

## 2025-04-09 RX ORDER — CLONIDINE HYDROCHLORIDE 0.3 MG/1
0.3 TABLET ORAL 2 TIMES DAILY
Qty: 60 TABLET | Refills: 5 | OUTPATIENT
Start: 2025-04-09

## 2025-04-09 RX ORDER — CLONIDINE HYDROCHLORIDE 0.3 MG/1
0.3 TABLET ORAL 2 TIMES DAILY
Qty: 180 TABLET | Refills: 2 | Status: SHIPPED | OUTPATIENT
Start: 2025-04-09